# Patient Record
Sex: MALE | Race: WHITE | NOT HISPANIC OR LATINO | Employment: OTHER | URBAN - METROPOLITAN AREA
[De-identification: names, ages, dates, MRNs, and addresses within clinical notes are randomized per-mention and may not be internally consistent; named-entity substitution may affect disease eponyms.]

---

## 2017-04-21 ENCOUNTER — TELEPHONE (OUTPATIENT)
Dept: NEUROLOGY | Facility: CLINIC | Age: 82
End: 2017-04-21

## 2017-04-21 NOTE — TELEPHONE ENCOUNTER
Called Dr. Carrillo office and was notified that the provider was out for the day. Provider nurse was able to give me information on pt to call and go about scheduling pt a consult. I called pt son Milena and left a voicemail informing him to call back so I can schedule his father an appointment regarding parkinson's.       the number that was given was [phone] (milena pt son)

## 2017-04-25 ENCOUNTER — TELEPHONE (OUTPATIENT)
Dept: NEUROLOGY | Facility: CLINIC | Age: 82
End: 2017-04-25

## 2017-04-25 NOTE — TELEPHONE ENCOUNTER
----- Message from Bia Cisse sent at 4/25/2017  3:16 PM CDT -----  Contact: jenny (son) @ [phone]  Returning liane's call concerning pts appts.

## 2017-04-25 NOTE — TELEPHONE ENCOUNTER
Spoken to pt son and he stated that his father needs orders in regarding a feeding tube and hospital bed so that his father can come to here to [place] to live with him. He's been trying to contact Yoana the  or  to get everything transferred and faxed over so that his father can move here and continue his treatment regarding Parkinson's and vascular dementia.     Case manger or  name  Yoana Garvin  # [phone]  Fax# [phone]    E-mail: [email or web address]       [facility]

## 2017-04-25 NOTE — TELEPHONE ENCOUNTER
----- Message from Yvette Diaz sent at 4/24/2017 11:52 AM CDT -----  Contact: Kael treadwell [phone]  Kael is returning Lynette's call to schedule an new patient appt for his dad.

## 2017-04-28 ENCOUNTER — TELEPHONE (OUTPATIENT)
Dept: NEUROLOGY | Facility: CLINIC | Age: 82
End: 2017-04-28

## 2017-04-28 NOTE — TELEPHONE ENCOUNTER
Spoke with pt's son, Kael, in regards to working on scheduling appt to establish care for his PD here at [facility]. Pt is currently at [facility] in [place]. He currently has a PEG tube after recent aspiration due to disease progression(?). He is seeking assistance from case management here within [facility] to work on caregiver needs (hospital bed, feeding pump etc..) in order for the son to be able to take care of him at home. Patient has been scheduled or 5/18/17 with NP Elizabet to establish care (1.5 hrs dedicated for appt). Appt viewable in [email or web address] for Kael.

## 2017-04-28 NOTE — TELEPHONE ENCOUNTER
----- Message from Yvette Diaz sent at 4/27/2017  2:32 PM CDT -----  Contact: Kael treadwell [phone]  Kael is calling to speak with anyone that can help him get his dad an appt as soon as possible. Please call Kael back he was very annoyed (and rude) that he keeps missing Lynette's call's and that a time is not set aside for him to talk to a nurse about his dad's care. Please call

## 2017-05-18 ENCOUNTER — OFFICE VISIT (OUTPATIENT)
Dept: NEUROLOGY | Facility: CLINIC | Age: 82
End: 2017-05-18
Payer: MEDICARE

## 2017-05-18 VITALS
SYSTOLIC BLOOD PRESSURE: 100 MMHG | DIASTOLIC BLOOD PRESSURE: 59 MMHG | WEIGHT: 110.44 LBS | HEIGHT: 65 IN | BODY MASS INDEX: 18.4 KG/M2 | HEART RATE: 67 BPM

## 2017-05-18 DIAGNOSIS — G20.C PARKINSONISM, UNSPECIFIED PARKINSONISM TYPE: ICD-10-CM

## 2017-05-18 DIAGNOSIS — E46 MALNUTRITION: Primary | ICD-10-CM

## 2017-05-18 DIAGNOSIS — F02.80 DEMENTIA DUE TO PARKINSON'S DISEASE WITHOUT BEHAVIORAL DISTURBANCE: ICD-10-CM

## 2017-05-18 DIAGNOSIS — R13.10 DYSPHAGIA, UNSPECIFIED TYPE: ICD-10-CM

## 2017-05-18 DIAGNOSIS — G20.A1 DEMENTIA DUE TO PARKINSON'S DISEASE WITHOUT BEHAVIORAL DISTURBANCE: ICD-10-CM

## 2017-05-18 PROCEDURE — 99999 PR PBB SHADOW E&M-EST. PATIENT-LVL III: CPT | Mod: PBBFAC,,, | Performed by: NURSE PRACTITIONER

## 2017-05-18 PROCEDURE — 99205 OFFICE O/P NEW HI 60 MIN: CPT | Mod: S$PBB,,, | Performed by: NURSE PRACTITIONER

## 2017-05-18 PROCEDURE — 99213 OFFICE O/P EST LOW 20 MIN: CPT | Mod: PBBFAC | Performed by: NURSE PRACTITIONER

## 2017-05-18 RX ORDER — OXYBUTYNIN CHLORIDE 5 MG/1
5 TABLET ORAL DAILY
COMMUNITY
End: 2017-05-21

## 2017-05-18 RX ORDER — DONEPEZIL HYDROCHLORIDE 5 MG/1
5 TABLET, FILM COATED ORAL NIGHTLY
COMMUNITY
End: 2017-06-21 | Stop reason: SDUPTHER

## 2017-05-18 RX ORDER — CARBIDOPA AND LEVODOPA 25; 100 MG/1; MG/1
2 TABLET, EXTENDED RELEASE ORAL 2 TIMES DAILY
COMMUNITY

## 2017-05-18 NOTE — Clinical Note
Need to see him back at 0815 AM appt- please see me to arrange- Son will be out of country starting Yumiko 3.

## 2017-05-18 NOTE — LETTER
May 19, 2017      Marielena Rose MD  [address]  [facility] [address]           [facility]  [address]  [facility] [address]  Phone: [phone]  Fax: [phone]          Patient: Justin Sharif   MR Number: [id number]   YOB: 1931   Date of Visit: 5/18/2017       Dear Dr. Marielena Rose:    Thank you for referring Justin Sharif to me for evaluation. Attached you will find relevant portions of my assessment and plan of care.    If you have questions, please do not hesitate to call me. I look forward to following Justin Sharif along with you.    Sincerely,    Meghana Mcneal, CAPRICE    Enclosure  CC:  No Recipients    If you would like to receive this communication electronically, please contact [email or web address] or [phone] to request more information on [employer] access.    For providers and/or their staff who would like to refer a patient to [facility], please contact us through our one-stop-shop provider referral line, [facility], at [phone].    If you feel you have received this communication in error or would no longer like to receive these types of communications, please e-mail [email or web address]

## 2017-05-18 NOTE — PROGRESS NOTES
Name: Justin Sharif  MRN: [id number]   CSN: [id number]      Date: 05/18/2017    Referring physician:  Marielena Rose MD  [address] [place] [address]    Chief Complaint / Interval History: Consult      History of Present Illness (HPI):  86 yo RH male who present for evaluation for parkinsonism, suspected atypical, and possible vascular dementia per son, Kael, who accompanies him today. He was not aware of his first symptom,RH tremor, rather it was brought to his attention by his lady friend in summer of 2015. About the same time, he had a shuffled gait. Early into these symptoms, he was having problems swallowing but always attributed to drainage issues. He was given cd/ld, which never seemed to help and then Neupro Patch. The patch was very expensive and did not appear to help either. Neupro was was stopped when he was admitted (in [place]) recently for aspiration pneumonia and malnutrition. At the time Neupro was stopped, cd/ld was doubled (now taking CR 25/100- 2 tabs BID). Still no differences seen with this adjustment. In fact, they are not sure that he has ever had response to PD meds. Due to recent MRI brain, he was recently started on donepezil due to question of vascular dementia. He was also told that he has had several mini-strokes.     Prior to his hospital admission, he was living alone and independent with all ADLs. He was playing golf the Thursday before his recent hospital admission. Kael last saw him at [place] and was fine, normal weight, excellent appetite and normal cognitive state. To this point, they had not noted any degradation in cognition. From January to March, there was a definite change. Kael could hear changes in his voice when they would talk on the phone. By March, he was growing confused. His lady friend also noted changes. Due to sons concerns, neighbors went to check on him. At that point, he was admitted for aspiration pneumonia and was  "discovered to be severely malnourished. He says he was eating but not that much. He was not scared of choking and was not aware he had any issues with swallowing.      Mr. Sharif denies muscle stiffness. For the past couple of years, his speech has been declining. He did have ST.  Over the past few months, he has had had a rare feeling of lightheadedness, no syncope. Describes poor bladder control. If gets urge, he must go at once. He thinks this has been going on at least 9 months. Son thinks it has matthew at least the last 1.5 years because required Depends anytime he traveled. Mr. Sharif has noted shadows in his periphery only. Son has also noted he has observed unusual facial movements for the past year.     Son offers that when they traveled from [place] to [facility] yesterday, they stopped every 2 hours so he could use the restroom and then planned stops for bolus feeds and meds thru tube. Son states that this entire transfer was very coordinated so they could make today's appt so he can get his needed supplies and machine for tube feeds. This was not arranged when he left SNF in [place]. Son states he could not get anyone from here ([facility]) or facility in [place] to connect so they left with a case of feedings that might last him until Monday morning but not sure. They do not have a pump or tubing. He states no one here ( at [facility]) ever ordered the hospital bed or other equipment so he was left to buy a Tempurpedic mattress that would raise 40 degrees to accommodate safe feedings.        Nonmotor/Premotor ROS:  Dysphagia (HENT)?Yes  RBD/sleep issues (Constitutional)?No  Fatigue (Constitutional)?No  Constipation (GI)?No- but then says in past has had and some straining to start   Urinary issues ()?Yes  Orthostasis (Cardiovascular)?No  Falls (Musculoskeletal)?No  Cognitive impairment (Neurologic)?"think its pretty good, except for a few details"  Psychoses (Psychiatric)?No  Pain/Paresthesia (Neurologic)?No  Stridor / " "SOB (Pulm)?No    Past Medical History: The patient  has no past medical history on file.    Social History: The patient  reports that he has never smoked. He does not have any smokeless tobacco history on file.    Family History: Their family history is not on file.    Allergies: Alfuzosin and Codeine     Meds:   No current outpatient prescriptions on file prior to visit.     No current facility-administered medications on file prior to visit.        Exam: focused exam only today- given that the priority is now getting his home supplies and family education so he can get nourishment  BP (!) 100/59  Pulse 67  Ht 5' 5" (1.651 m)  Wt (!) 501 kg (1104 lb 8.1 oz)  .8 kg/m2    Constitutional  Thin, appears stated age, cooperative, pleasant   Neurological    * Mental status  MOCA = deferred     - Orientation  Oriented to person, place, and situation     - Memory   Not tested. Does participate in history.     - Attention/concentration  Attentive during exam     - Language  +2-step commands   * Cranial nerves       - CN II  PERRL, visual fields full to confrontation     - CN III, IV, VI  Extraocular movements full, normal pursuits and saccades     - CN V  Sensation V1 - V3 intact     - CN VII  Face strong and symmetric bilaterally     - CN VIII  Hearing intact bilaterally with hearing aids but is [place]     - CN IX, X  Palate raises midline and symmetric     - CN XI  SCM and trapezius 5/5 bilaterally     - CN XII  Tongue midline   * Gait  See below.   * Specialized movement exam  Last dose 0630- On CR- exam time 1200  Mild hypophonic speech, mild dysarthria.   Mild facial masking.   Cogwheel rigidity- BUE- mild, and increases on diversion (slt more noted LUE). Slt cogwheel LLE, and      Mild bradykinesia all extremities, noted more on left side.    No tremor noted today.   No evidence of dyskinesia noted today.     No other dystonia, chorea, athetosis, myoclonus, or tics.   Pushes self to stand.    No motor " impersistence.   Narrow-based gait.   Slt shortened stride length.   Slt stooped posture.   Reduced arm swing.       Laboratory/Radiological:  - Results:No results found for any previous visit.    Will review records son brought today.   - Independent review of images:    Diagnoses:          Parkinsonism- query atypical given timeline and progression  Questionable dementia, ? vascular  Dysphagia with recent aspiration pneumonia  Malnutrition- now with PEG tube          Medical Decision Makin yo gentleman presents for initial evaluation of parkinsonism and possible vascular dementia, accompanied by son, Kael. He was discharged from nursing rehab facility in [place] after being admitted for malnutrition and aspiration pneumonia. If he stayed in [place], he would have had to be admitted to a nursing home as could not manage tube feeds independently. Since Mr. Sharif was playing golf a few days before his hospital admission, Kael decided to bring him to [place] to live with him, rather than nursing home placement. aKel picked his father up from rehab facility yesterday at 0800 and arrived in [facility] at 2200 last night. One of the main goals for them today is to get needed equipment so he can be fed. Neurology is the only appointment scheduled. He does not have any appointments scheduled with any other providers. He does not have a PCP here at all.      According to Kael, Mr. Sharif was discharged from the rehab facility yesterday with one day supply of crushed meds and enough cans of feedings for about 3 days. There is no arrangement for home health. He does not have equipment to administer the continuous feedings. At this point, Kael and his wife are administering bolus feeds until they can get feeding pump. They are also administering meds per tube. He talks about how cumbersome this routine is for them when before he had staff to do this. As well orchestrated as some of this appears to be with records  and travel, there are significantly important pieces that did not happen. I have many concerns at this point, including that after one day of the tube feeding and med administering routine, it is already cumbersome.     Kael is dissatisfied that we ([facility]) did not have better coordination of services before they made this transfer, especially since he called the office. While I sympathize with their situation, this was not our charge. This should have been coordinated by the SNF in [place] with education to Kael what was needed. We cannot order DMEs and services for a patient that we have never met. Likewise, neurology does not manage tube feeds and I certainly do not feel comfortable doing so.       Clearly, the priority today is not trying to distinguish which type of parkinsonism and or dementia he may have.  The priority is nutrition and education for family.  If we cannot get this accomplished, he will need to be admitted. I am not sure how much education they were given before leaving [place].     I have ordered home health, including orders for tube feeding (Jevity 1.5 at 60cc/hr continuous - as per his discharge instructions from dated 5-16-17 from [place], [place]).   I have made it very clear that I do not manage tube feeds nor do I feel comfortable with this. I feel even more uncomfortable letting them try and navigate this on their own, especially going into the weekend.    I have informed Juan Hanson RN. He is working on getting him established with a geriatrician, internist, or gastroenterologist ASAP.    I have ordered stat referral for nutritionist. I am not certain that this service is available for outpatient.      From a parkinsonian standpoint, he will continue cd/ld for now. He will continue donepezil without change. He does have prescriptions from [place] for these to have filled. I will arrange another visit for this clinic. I would  like for him to come in without cd/ld at next visit.     I spent from [id number] face-to-face with the patient and son with >50% of the time spent with counseling and education regarding above plan.    ..      Meghana Mcneal, SETH, NP-C  Division of Movement and Memory Disorders  [facility]  [phone]

## 2017-05-19 ENCOUNTER — TELEPHONE (OUTPATIENT)
Dept: NEUROLOGY | Facility: CLINIC | Age: 82
End: 2017-05-19

## 2017-05-19 ENCOUNTER — TELEPHONE (OUTPATIENT)
Dept: INTERNAL MEDICINE | Facility: CLINIC | Age: 82
End: 2017-05-19

## 2017-05-19 DIAGNOSIS — E46 MALNUTRITION: Primary | ICD-10-CM

## 2017-05-19 NOTE — TELEPHONE ENCOUNTER
I spoke with patient's son about an appointment to be seen in clinic today.  Patient's son stated that home health would be coming out today and would prefer to be seen on tomorrow or Sunday.  I offered an urgent appointment for Saturday and/or an appointment on Sunday to establish care with Dr. Rae.  Patient's son accepted an appointment for Sunday at 10 am to establish care with Dr. Rae

## 2017-05-19 NOTE — TELEPHONE ENCOUNTER
Called Mayelin back at [facility] about message.  Mayelin states that Physician needs to separately order feeding pump because they are not carried, and were not included in the order for Nutrition or Home Health.  I stated that Meghana ordered the tube feeding and did not know it would be a separate order being that you cannot tube feed without tube.  Mayelin also stated that We, as in our staff needs to send faxed order to [employer] or a company of choice that accepts patient's insurance to facilitate.  I stated that Meghana is out of the office and i will try to have order put in and signed by physician Meghana works with, and send accordingly. Mayelin verbally expressed understanding.

## 2017-05-19 NOTE — TELEPHONE ENCOUNTER
----- Message from Ev Hardy MA sent at 5/19/2017 10:07 AM CDT -----  Good Morning,    I work with Meghana Mcneal NP in Neuro Clinic, who has put orders for AMB referral for Nutrition services  for Mr. Sharif as STAT.  This isn't something we typically order.  It is an internal order, but for outpatient. Also, we just received word that patient is being seen by Home Health today, but i'm not sure if this order needs to be facilitated separately.  Any help with this patient will be greatly appreciated!   Happy Friday!    Ev Hardy MA

## 2017-05-21 ENCOUNTER — OFFICE VISIT (OUTPATIENT)
Dept: INTERNAL MEDICINE | Facility: CLINIC | Age: 82
End: 2017-05-21
Payer: MEDICARE

## 2017-05-21 ENCOUNTER — TELEPHONE (OUTPATIENT)
Dept: INTERNAL MEDICINE | Facility: CLINIC | Age: 82
End: 2017-05-21

## 2017-05-21 VITALS
HEART RATE: 67 BPM | OXYGEN SATURATION: 99 % | SYSTOLIC BLOOD PRESSURE: 100 MMHG | BODY MASS INDEX: 18.33 KG/M2 | HEIGHT: 65 IN | DIASTOLIC BLOOD PRESSURE: 60 MMHG | WEIGHT: 110 LBS

## 2017-05-21 DIAGNOSIS — L85.3 DRY SKIN: ICD-10-CM

## 2017-05-21 DIAGNOSIS — E53.8 VITAMIN B12 DEFICIENCY: ICD-10-CM

## 2017-05-21 DIAGNOSIS — E55.9 VITAMIN D INSUFFICIENCY: ICD-10-CM

## 2017-05-21 DIAGNOSIS — E87.6 HYPOKALEMIA: ICD-10-CM

## 2017-05-21 DIAGNOSIS — E46 MALNUTRITION: Primary | ICD-10-CM

## 2017-05-21 DIAGNOSIS — R35.0 URINARY FREQUENCY: ICD-10-CM

## 2017-05-21 DIAGNOSIS — R13.10 DYSPHAGIA, UNSPECIFIED TYPE: ICD-10-CM

## 2017-05-21 DIAGNOSIS — H35.30 MACULAR DEGENERATION, BILATERAL: ICD-10-CM

## 2017-05-21 PROCEDURE — 99205 OFFICE O/P NEW HI 60 MIN: CPT | Mod: S$PBB,,, | Performed by: INTERNAL MEDICINE

## 2017-05-21 PROCEDURE — 99214 OFFICE O/P EST MOD 30 MIN: CPT | Mod: PBBFAC | Performed by: INTERNAL MEDICINE

## 2017-05-21 PROCEDURE — 99999 PR PBB SHADOW E&M-EST. PATIENT-LVL IV: CPT | Mod: PBBFAC,,, | Performed by: INTERNAL MEDICINE

## 2017-05-21 RX ORDER — OXYBUTYNIN CHLORIDE 5 MG/1
5 TABLET, EXTENDED RELEASE ORAL DAILY
Qty: 30 TABLET | Refills: 11 | Status: SHIPPED | OUTPATIENT
Start: 2017-05-21 | End: 2017-06-01

## 2017-05-21 NOTE — PATIENT INSTRUCTIONS
Please use over-the-counter Eucerin, Cetaphil, or Aveeno once daily after taking a shower to help keep skin healthy. Please also use sunscreen and skin covering when outdoors in the sun.

## 2017-05-21 NOTE — PROGRESS NOTES
"Subjective:       Patient ID: Justin Sharif is a 85 y.o. male.    Chief Complaint: Establish Care    HPI    Patient is accompanied by his son, Kael.    New pt, first visit with me, seen in past by Neurology. Pt reports that was living in [place], had aspiration pneumonia recently. Was on 24 hr continuous feed, went on hospital at 110#, had low prealbumin, likely due to aspiration. Has been getting bolus feeds, only 8 cans of food left. Uses Jevity 1.5 60 ml/hr 1440mL.    HH came out on Fri. Discussed nutrition. Going to have Physical Therapy evaluation tomorrow. Started with Neurology in 2015. Pt is hoping for ST/PT/OT to help with swallowing. Has upcoming appointment for PT with Home Health. Lungs now clear; RN noted decreased sounds in R side. "Biggest problem is thick saliva". Using Biotene. Also going to bathroom more frequently; has been getting up at least once for long time, but now going more frequently. Now 4x/night.    Review outside records indicates hospitalization with admission April 2017 for Parkinson's disease with aspiration pneumonia.  During that admission he was noted to have failed modified barium swallow, decision was made to place PEG tube.  An MRI of the brain was performed during this hospital stay, noted no acute stroke or significant intracranial process.  There is mild to moderate cerebral volume loss with evidence of possible chronic microvascular ischemia.    Review of Systems   All other systems reviewed and are negative.      Objective:      Physical Exam   Constitutional: No distress.   Thin  man   HENT:   Head: Atraumatic.   Right Ear: Tympanic membrane normal.   Left Ear: Tympanic membrane normal.   Mouth/Throat: Oropharynx is clear and moist. No oropharyngeal exudate.   Wears bilateral hearing aides   Eyes: Conjunctivae are normal. Pupils are equal, round, and reactive to light. Right eye exhibits no discharge. Left eye exhibits no discharge.   Neck: Normal range of motion. No " "thyromegaly present.   Cardiovascular: Normal rate, regular rhythm and normal heart sounds.    Pulses:       Dorsalis pedis pulses are 1+ on the right side, and 1+ on the left side.        Posterior tibial pulses are 1+ on the right side, and 1+ on the left side.   Pulmonary/Chest: Effort normal and breath sounds normal. He has no wheezes. He has no rales.   Abdominal: Soft. He exhibits no distension and no mass. There is no hepatosplenomegaly. There is no tenderness. There is no rigidity, no guarding and negative Mahmood's sign.   PEG tube in RUQ, site has bandage overlying that is clean, dry, and intact.   Musculoskeletal: He exhibits no edema or tenderness.   Bunion noted in lateral aspect of left foot   Feet:   Right Foot:   Skin Integrity: Negative for ulcer, blister, skin breakdown or erythema.   Left Foot:   Skin Integrity: Negative for ulcer, blister, skin breakdown or erythema.   Lymphadenopathy:     He has no cervical adenopathy.   Neurological: He is alert. Gait normal.   Able to get up to and down from exam table to chair without limitation or assistance.   Skin: Skin is warm and dry. Rash (mild erythema in patches along chin and neck) noted.   Small scratch hong along R arm distal to elbow, healing well without exudate.   Psychiatric: He has a normal mood and affect. His behavior is normal.   Nursing note and vitals reviewed.      Vitals:    05/21/17 1015   BP: 100/60   Pulse: 67   SpO2: 99%   Weight: 49.9 kg (110 lb)   Height: 5' 5" (1.651 m)     Body mass index is 18.3 kg/m².    RESULTS: Reviewed outside stress test 2008, also ultrasound aorta 2003. Reviewed outside labs from last 2 months.    Assessment:       1. Malnutrition    2. Urinary frequency    3. Dysphagia, unspecified type    4. Macular degeneration, bilateral    5. Hypokalemia    6. Vitamin B12 deficiency    7. Dry skin    8. Vitamin D insufficiency        Plan:   Justin was seen today for establish care.    Diagnoses and all orders for this " "visit:    Malnutrition:  This is likely due to dysphagia from his neurologic dysfunction.  Weight has steadily been increasing with continuous tube feeds from skilled nursing.  Patient is now on bolus feeds as he is no longer in a nursing facility.  We will continue to work with home health and nutrition to determine optimal nutrition status.  -     Vitamin D; Future  -     CBC Without Differential; Future  -     TSH; Future  -     Magnesium; Future  -     PREALBUMIN; Future  -     Ambulatory referral to Outpatient Case Management    Urinary frequency:  Outside urine tests last mo were normal. Relieved during day with oxybutynin IR, change to long-acting, if side effects develop notify the office.  -     oxybutynin (DITROPAN-XL) 5 MG TR24; Take 1 tablet (5 mg total) by mouth once daily.    Dysphagia, unspecified type:  Continue bolus feeds of Jevity for now, continue work with Home Health and nutrition to determine if continuous feeds still necessary. Refer to Case Mgmt for assistance. I will message GI to see if there are any limitations to golfing when a pt has a PEG tube.  -     Ambulatory referral to Outpatient Case Management    Macular degeneration, bilateral:  Receiving injections, refer to Ophthalmology for evaluation, request prior records.  -     Ambulatory referral to Ophthalmology    Dry skin:  "Please use over-the-counter Eucerin, Cetaphil, or Aveeno once daily after taking a shower to help keep skin healthy. Please also use sunscreen and skin covering when outdoors in the sun."    Hypokalemia:  Seen on recent labs, recheck.  -     Comprehensive metabolic panel; Future    Vitamin B12 deficiency:  Continue biweekly B12 for now, once complete start over-the-counter B12 daily via PEG.  -     Vitamin B12; Future    Vitamin D insufficiency  -     Vitamin D; Future    Other orders  -     lactose-reduced food with fibr (JEVITY 1.5 RAEANN) 0.06 gram-1.5 kcal/mL Liqd; Give 1 can (237 cc) every 3-4 hours (total 6 " cans per day).    Return in about 8 weeks (around 7/16/2017).  Virgil Rae MD  Internal Medicine    Portions of this note were completed using Dragon medical dictation software. Please excuse typographical or syntax errors.

## 2017-05-22 ENCOUNTER — PATIENT MESSAGE (OUTPATIENT)
Dept: INTERNAL MEDICINE | Facility: CLINIC | Age: 82
End: 2017-05-22

## 2017-05-22 ENCOUNTER — TELEPHONE (OUTPATIENT)
Dept: NEUROLOGY | Facility: CLINIC | Age: 82
End: 2017-05-22

## 2017-05-22 ENCOUNTER — OUTPATIENT CASE MANAGEMENT (OUTPATIENT)
Dept: ADMINISTRATIVE | Facility: OTHER | Age: 82
End: 2017-05-22

## 2017-05-22 NOTE — TELEPHONE ENCOUNTER
Patient recently transferred care from [place].  [facility] home [facility] evaluated on Friday, sent me orders to sign today for dietician. Please call to confirm everything is in place and no additional orders required.  Also, do they need a specific order for his Jevity tube feeding?    Patient was also receiving vitamin B12 injections every 2 weeks.  Please ask nursing to begin administering intramuscular vitamin B12 1000 µg every 2 weeks while they are working with him.    Lastly, I would like to follow up the patient in 6-8 weeks to ensure that things are stable.  I would like him scheduled for a 40 minute follow-up [facility] visit. Please schedule with the patient.

## 2017-05-22 NOTE — TELEPHONE ENCOUNTER
Called and left voice message for sonKael to return call.  Meghana would like to see patient back on 5/30 8:15 for two hour visit.  Also to advise patient not to take any medication before visit.

## 2017-05-22 NOTE — PROGRESS NOTES
For your Information:    Please note the following patient has been assigned to SUSI Lawler with [facility] for screening.    Reason: Low/Mod Risk    Referral Diagnosis:   Malnutrition  Dysphagia, unspecified type    Referral Comment:   Patiently recently moved here from [place] to live with his son, who is now serving as the primary caregiver. The patient has had progressive neurologic symptoms requiring PEG tube and tube feeds. He was receiving this via skilled nursing facility back in [place] before coming down on Thursday, May 18. [facility] has started to work with the patient. These assist with coordination of care at [facility] along with coordination of services through home health. If there are other orders needed for continued tube feeds, please let me know.    Please contact [facility] at [phone] with questions.    Thank you,  Sarina Quiros, SSC

## 2017-05-22 NOTE — TELEPHONE ENCOUNTER
----- Message from Meghana Mcneal NP sent at 5/19/2017 10:14 AM CDT -----  Need to see him back at 0815 AM appt- please see me to arrange- Son will be out of country starting Yumiko 3.

## 2017-05-23 ENCOUNTER — OUTPATIENT CASE MANAGEMENT (OUTPATIENT)
Dept: ADMINISTRATIVE | Facility: OTHER | Age: 82
End: 2017-05-23

## 2017-05-23 ENCOUNTER — TELEPHONE (OUTPATIENT)
Dept: INTERNAL MEDICINE | Facility: CLINIC | Age: 82
End: 2017-05-23

## 2017-05-23 ENCOUNTER — TELEPHONE (OUTPATIENT)
Dept: NEUROLOGY | Facility: CLINIC | Age: 82
End: 2017-05-23

## 2017-05-23 DIAGNOSIS — R63.4 WEIGHT LOSS: ICD-10-CM

## 2017-05-23 DIAGNOSIS — R13.10 DYSPHAGIA, UNSPECIFIED TYPE: ICD-10-CM

## 2017-05-23 DIAGNOSIS — E46 MALNUTRITION: Primary | ICD-10-CM

## 2017-05-23 DIAGNOSIS — E53.8 VITAMIN B12 DEFICIENCY: ICD-10-CM

## 2017-05-23 NOTE — TELEPHONE ENCOUNTER
Called patient's son, Kael, twice to schedule patient's follow up with Meghana; have yet to hear back from him.  Scheduled patient for 5/30 for 2 hour OFF/ON evaluation at 8:15am. Left voicemail for patient's son.

## 2017-05-23 NOTE — TELEPHONE ENCOUNTER
----- Message from SUSI Arellano sent at 5/23/2017 12:15 PM CDT -----  This CSW received a referral on the above patient.  This CSW contacted patient regarding referral.  CSW completed assessment with patient . No needs were identified.     Thank you for the referral,  SUSI Lawler

## 2017-05-23 NOTE — PROGRESS NOTES
This CSW contacted patient  regarding referral.  . CSW completed assessment with patient .  Patient reports that HH agency has been out to the home to provide care.  Patient stated there were no needs at this time.  Patient reports that he would like his son to speak with this CSW..  CSW provided patient with her contact information.

## 2017-05-24 ENCOUNTER — OUTPATIENT CASE MANAGEMENT (OUTPATIENT)
Dept: ADMINISTRATIVE | Facility: OTHER | Age: 82
End: 2017-05-24

## 2017-05-24 NOTE — TELEPHONE ENCOUNTER
Dr Rae I had sent a mssg to [facility]   In regards to the dietcian and the B12 injections   See response below

## 2017-05-24 NOTE — TELEPHONE ENCOUNTER
Good Morning Darlene  I was wondering if you could hepl me out   Dr Rae wanted to make sure that this pt    recently transferred care from [place].  [facility] evaluated on Friday, sent me orders to sign  for dietician which has been done.  He wants me to confirm everything is in place and no additional orders required.  Also, do they need a specific order for his Jevity tube feeding? But I do not know who to contact or send this to?     Patient was also receiving vitamin B12 injections every 2 weeks.  Please ask nursing to begin administering intramuscular vitamin B12 1000 µg every 2 weeks while they are working with him.     Can you help me if at all possible?  Yee

## 2017-05-24 NOTE — PROGRESS NOTES
"CSW received a follow up e-mail from Dr. Butch HAUSER regarding if patient needed a specific order for  Jevity tube feeding.  Dr. Rae also wanted HH to start administering intramuscular vitamin B12 1000 µg every 2 weeks while they are working with him."   CSW contacted Lashell Bonilla for [facility] . [phone]. Lashell advised this CSW that an order has not been placed for dietician services. Lashell advised this CSW that their nurses provide nutritional education , however if doctor is requesting a dietician an order has to be placed. Dietician services are handle through another company call [facility]. Lashell advised this CSW once order has been placed they will work with [facility] for dietician consult. Lashell advised this CSW that an order would need to be placed for B12 injections. Lashell advised this CSW that clarification for tube feeding was sent to Elizabet Kowalski NP, however they have not received a response. [facility] is handling patient equipment and supplies, however still waiting on response from Elizabet Reed NP. CSW advised Lashell that patient is being followed by PCP Dr. Rae. CSW also contacted patient son to verify if any additionl resources are needed. CSW left a message for a return call on recorder.         "

## 2017-05-24 NOTE — TELEPHONE ENCOUNTER
----- Message from SUSI Arellano sent at 5/24/2017  4:18 PM CDT -----  CSW contacted Lashell Bonilla for [facility]. [phone]. Lashell advised this CSW that an order has not been placed for dietician services. Lashell advised this CSW that their nurses provide nutritional education , however if doctor is requesting a dietician an order has to be placed. Dietician services are handle through another company call [facility]. Lashell advised this CSW once order has been placed they will work with [facility] for dietician consult. Lashell advised this CSW that an order would need to be placed for B12 injections. Lashell advised this CSW that clarification for tube feeding was sent to Elizabet Kowalski NP, however they have not received a response. [facility] is handling patient equipment and supplies, however still waiting on response from Elizabet Reed NP. CSW advised Lashell that patient is being followed by PCP Dr. Rae. Please fax updated orders to [phone]      SUSI Lawler

## 2017-05-25 RX ORDER — CYANOCOBALAMIN 1000 UG/ML
1000 INJECTION, SOLUTION INTRAMUSCULAR; SUBCUTANEOUS
Qty: 10 ML | Refills: 3 | Status: SHIPPED | OUTPATIENT
Start: 2017-05-25 | End: 2017-06-01 | Stop reason: SDUPTHER

## 2017-05-25 NOTE — TELEPHONE ENCOUNTER
Spoke with Lashell. Will fax orders for B12 injection @ [phone]. Advised her on instructions per MD regarding GI referral, tube feeding, and nutrition services. Waiting for call back from pt in regards to what pharmacy he uses so we can send the order for B12 injections. Lashell verbalized understanding.

## 2017-05-25 NOTE — TELEPHONE ENCOUNTER
I have placed an order for B12 injections once every 2 weeks.  Please fax prescription to the number listed in the message below.  Please ask RN to administer q2 weeks.    I am unable to sign the order for nutrition services, as Medicare will not cover this for evaluation of tube feedings.  As a result I am referring to gastroenterology instead for further evaluation.  Please notify home health that I would like for nursing or case management to provide recommendations for tube feedings at this time until patient can be seen by gastroenterology.

## 2017-05-26 ENCOUNTER — OUTPATIENT CASE MANAGEMENT (OUTPATIENT)
Dept: ADMINISTRATIVE | Facility: OTHER | Age: 82
End: 2017-05-26

## 2017-05-26 NOTE — PROGRESS NOTES
This CSW received a message on recorder from patient son. CSW contacted patient son no answer CSW left a message

## 2017-05-30 ENCOUNTER — OFFICE VISIT (OUTPATIENT)
Dept: NEUROLOGY | Facility: CLINIC | Age: 82
End: 2017-05-30
Payer: MEDICARE

## 2017-05-30 VITALS
BODY MASS INDEX: 18.55 KG/M2 | DIASTOLIC BLOOD PRESSURE: 68 MMHG | HEART RATE: 64 BPM | HEIGHT: 65 IN | WEIGHT: 111.31 LBS | SYSTOLIC BLOOD PRESSURE: 143 MMHG

## 2017-05-30 DIAGNOSIS — G20.C PARKINSONISM, UNSPECIFIED PARKINSONISM TYPE: Primary | ICD-10-CM

## 2017-05-30 PROCEDURE — 99999 PR PBB SHADOW E&M-EST. PATIENT-LVL III: CPT | Mod: PBBFAC,,, | Performed by: NURSE PRACTITIONER

## 2017-05-30 PROCEDURE — 99215 OFFICE O/P EST HI 40 MIN: CPT | Mod: S$PBB,,, | Performed by: NURSE PRACTITIONER

## 2017-05-30 PROCEDURE — 99213 OFFICE O/P EST LOW 20 MIN: CPT | Mod: PBBFAC | Performed by: NURSE PRACTITIONER

## 2017-05-30 NOTE — PROGRESS NOTES
Name: Justin Sharif  MRN: [id number]   CSN: [id number]      Date: 05/30/2017    Referring physician:  Virgil Rae MD  [address] [place] [address]    Chief Complaint / Interval History: Follow-up      History of Present Illness (HPI):    Last dose cd/ld was last given at 1500 yesterday. He is not aware of any changes without cd/ld. His son disagrees stating that he is slower with thoughts, movements and actions. Mr. Sharif takes offense to these observations.     Mr. Sharif states he could not get comfortable last night to sleep. He states that he has not had any issues with this before last night.     Kael states that in Dec 2016, he was sharp cognitively. Beginning in March, noted changes whey they spoke on the phone. He was admitted to hospital late March/early April for malnutrition and aspiration pneumonia. Despite tube feeds, Kael has not noted cognitive change back to baseline.     Denies tremor.   Denies stiffness in muscles.   Denies slowness.  Balance -don't notice any difference without meds. States once in awhile he will notice a stiffness or shakiness when walks.   He is not aware of shuffling.   Son states he missed a step yesterday on the golf course when they were hitting balls. Mr. Sharif says he did not see the step. Did not fall, son caught him.     He does not take anything by mouth.  Home health has had trouble getting pump approved.     Does exercises daily and walks around the block 15 minutes daily.     Initial Consult: 05/18/2017  Chief Complaint / Interval History: Consult  History of Present Illness (HPI):  84 yo RH male who present for evaluation for parkinsonism, suspected atypical, and possible vascular dementia per son, Kael, who accompanies him today. He was not aware of his first symptom,RH tremor, rather it was brought to his attention by his lady friend in summer of 2015. About the same time, he had a shuffled gait. Early into these symptoms, he was having problems  swallowing but always attributed to drainage issues. He was given cd/ld, which never seemed to help and then Neupro Patch. The patch was very expensive and did not appear to help either. Neupro was was stopped when he was admitted (in [place]) recently for aspiration pneumonia and malnutrition. At the time Neupro was stopped, cd/ld was doubled (now taking CR 25/100- 2 tabs BID). Still no differences seen with this adjustment. In fact, they are not sure that he has ever had response to PD meds. Due to recent MRI brain, he was recently started on donepezil due to question of vascular dementia. He was also told that he has had several mini-strokes.   Prior to his hospital admission, he was living alone and independent with all ADLs. He was playing golf the Thursday before his recent hospital admission. Kael last saw him at [place] and was fine, normal weight, excellent appetite and normal cognitive state. To this point, they had not noted any degradation in cognition. From January to March, there was a definite change. Kael could hear changes in his voice when they would talk on the phone. By March, he was growing confused. His lady friend also noted changes. Due to sons concerns, neighbors went to check on him. At that point, he was admitted for aspiration pneumonia and was discovered to be severely malnourished. He says he was eating but not that much. He was not scared of choking and was not aware he had any issues with swallowing.    Mr. Sharif denies muscle stiffness. For the past couple of years, his speech has been declining. He did have ST.  Over the past few months, he has had had a rare feeling of lightheadedness, no syncope. Describes poor bladder control. If gets urge, he must go at once. He thinks this has been going on at least 9 months. Son thinks it has matthwe at least the last 1.5 years because required Depends anytime he traveled. Mr. Sharif has noted shadows in his periphery only. Son has also noted  "he has observed unusual facial movements for the past year.   Son offers that when they traveled from [place] to [facility] yesterday, they stopped every 2 hours so he could use the restroom and then planned stops for bolus feeds and meds thru tube. Son states that this entire transfer was very coordinated so they could make today's appt so he can get his needed supplies and machine for tube feeds. This was not arranged when he left [facility] in [place]. Son states he could not get anyone from here ([facility]) or facility in [place] to connect so they left with a case of feedings that might last him until Monday morning but not sure. They do not have a pump or tubing. He states no one here ( at [facility]) ever ordered the hospital bed or other equipment so he was left to buy a Tempurpedic mattress that would raise 40 degrees to accommodate safe feedings.       Nonmotor/Premotor ROS:  RBD/sleep issues (Constitutional)?No  Depression/anxiety (Psychiatric)?No  Fatigue (Constitutional)?No  Constipation (GI)?No  Urinary issues ()?Yes- frequency  Orthostasis (Cardiovascular)?No  Falls (Musculoskeletal)?No  Cognitive impairment (Neurologic)?'Some short term is tough"   Psychoses (Psychiatric)?No  Pain/Paresthesia (Neurologic)?No  Visual changes (Eyes)?Yes "age related macular degeneration" denies blurred vision or diplopia  Moles / skin changes (Skin)?Yes- "thin"  Stridor / SOB (Pulm)?No  Bruising (Heme)?Yes    Past Medical History: The patient  has a past medical history of Aspiration pneumonia (2017); Hiatal hernia (11/2013); History of colonic polyps (9/19/2013); Internal hemorrhoids (11/2013); and Schatzki's ring (11/2013).    Social History: The patient  reports that he has quit smoking. He quit after 14.00 years of use. He quit smokeless tobacco use about 52 years ago.    Family History: Their family history is not on file.    Allergies: Alfuzosin and Codeine     Meds:   Current Outpatient Prescriptions on File Prior to Visit " "  Medication Sig Dispense Refill    carbidopa-levodopa  mg (SINEMET CR)  mg TbSR Take 2 tablets by mouth 2 (two) times daily.      cyanocobalamin 1,000 mcg/mL injection Inject 1 mL (1,000 mcg total) into the muscle every 14 (fourteen) days. 10 mL 3    donepezil (ARICEPT) 5 MG tablet Take 5 mg by mouth every evening.      lactose-reduced food with fibr (JEVITY 1.5 RAEANN) 0.06 gram-1.5 kcal/mL Liqd Give 1 can (237 cc) every 3-4 hours (total 6 cans per day). 30 Can 11    oxybutynin (DITROPAN-XL) 5 MG TR24 Take 1 tablet (5 mg total) by mouth once daily. 30 tablet 11     No current facility-administered medications on file prior to visit.        Exam:  BP (!) 143/68   Pulse 64   Ht 5' 5" (1.651 m)   Wt 50.5 kg (111 lb 5.3 oz)   BMI 18.53 kg/m²     Constitutional  Well-developed, well-nourished, appears stated age   Cardiovascular  Radial pulses 2+ and symmetric, no LE edema bilaterally   Neurological    * Mental status  MOCA =      - Orientation  Oriented to person, place, time, and situation     - Memory   Intact recent and remote     - Attention/concentration  Attentive, vigilant during exam     - Language  Naming & repetition intact, +2-step commands     - Fund of knowledge  Aware of current events     - Executive  Well-organized thoughts   * Cranial nerves       - CN II  PERRL, visual fields full to confrontation     - CN III, IV, VI  Extraocular movements full, normal pursuits and saccades, no gaze palsy     - CN V  Sensation V1 - V3 intact     - CN VII  Face strong and symmetric bilaterally     - CN VIII  Hearing intact to finger rub R ear, diminished L ear.      - CN IX, X  Palate raises midline and symmetric     - CN XI  SCM and trapezius 5/5 bilaterally     - CN XII  Tongue midline   * Motor  Muscle bulk normal, strength 5/5 throughout   * Sensory   Intact to temperature and vibration throughout   * Coordination  No dysmetria with finger-to-nose or heel-to-shin   * Gait  See below.       Last " dose cd/ld CR was 1500 yesterday  He is not as talkative or animated this morning when compared to my initial visit with him earlier this month.    Recites months of year backwards without diff or hesitation, does invoke subtle very low amplitude rest tremor in LH.   Records previous describe palatal tremor- do not see today.     No construction apraxias.     ..III.  MOTOR EXAMINATION        Speech  2 - Monotone, slurred but understandable; moderately impaired.   Facial Expression  2 - Slight but definitely abnormal diminution of facial expression.    Tremor at Rest:      Face, lips, chin 0 - Absent.    Hands:      right 1- intermittent and provoked on diversion only   left 1 - Slight and infrequently present.    Feet:      right 0 - Absent.    left 0 - Absent.    Action or Postural Tremor of Hands      right 1 - Slight; present with action.   left 1 - Slight; present with action.   Rigidity      Neck 0 - Absent.   Upper Extremity: Right 2 - Mild.   Upper Extremity: Left 2 - Mild.   Lower Extremity: Right 1 - Slight or detectable only when activated by mirror or other movements.   Lower Extremity: Left 0 - Absent.   Finger Taps      right 2 - Moderately impaired. Definite and early fatiguing. May have occasional arrests in movement.   left 4 - Can barely perform the task.    Hand Movements      right 2 - Moderately impaired. Definite and early fatiguing. May have occasional arrests in movement.   left 2 - Moderately impaired. Definite and early fatiguing. May have occasional arrests in movement.  L>R   Rapid Alternating Movements of Hands      right 1 - Mild slowing and/or reduction in amplitude.   left 2 - Moderately impaired. Definite and early fatiguing. May have occasional arrests in movement.   Leg Agility      right 1 - Mild slowing and/or reduction in amplitude.   left 2 - Moderately impaired. Definite and early fatiguing. May have occasional arrests in movement.   Arising from Chair  0 - Normal.   Posture  2  - Moderately stooped posture, definitely abnormal; can be slightly leaning ot one side.    Gait  2 - Walks with difficulty, but requires little or no assistance; may have some festination, short steps, or propulsion.   Postural Stability (Response to sudden, strong posterior displacement produced by pull on shoulders while patient erect with eyes open and feet slightly apart. Patient is prepared, and can have had some practice runs.)  deferred   Body Bradykinesia and Hypokinesia (Combining slowness, hesitancy, decreased armswing, small amplitude, and poverty of movement in general)  2 - Mild degree of slowness and poverty of movement which is definitely abnormal.     Was going to do OFF/ON eval. Did not bring meds or syringe to administer.       Laboratory/Radiological:  - Results:  Lab Visit on 05/21/2017   Component Date Value Ref Range Status    Vit D, 25-Hydroxy 05/21/2017 43  30 - 96 ng/mL Final    Vitamin B-12 05/21/2017 780  210 - 950 pg/mL Final    WBC 05/21/2017 6.94  3.90 - 12.70 K/uL Final    RBC 05/21/2017 4.05* 4.60 - 6.20 M/uL Final    Hemoglobin 05/21/2017 12.6* 14.0 - 18.0 g/dL Final    Hematocrit 05/21/2017 38.9* 40.0 - 54.0 % Final    MCV 05/21/2017 96  82 - 98 fL Final    MCH 05/21/2017 31.1* 27.0 - 31.0 pg Final    MCHC 05/21/2017 32.4  32.0 - 36.0 % Final    RDW 05/21/2017 15.6* 11.5 - 14.5 % Final    Platelets 05/21/2017 223  150 - 350 K/uL Final    MPV 05/21/2017 11.3  9.2 - 12.9 fL Final    Sodium 05/21/2017 135* 136 - 145 mmol/L Final    Potassium 05/21/2017 4.5  3.5 - 5.1 mmol/L Final    Chloride 05/21/2017 97  95 - 110 mmol/L Final    CO2 05/21/2017 29  23 - 29 mmol/L Final    Glucose 05/21/2017 76  70 - 110 mg/dL Final    BUN, Bld 05/21/2017 21  8 - 23 mg/dL Final    Creatinine 05/21/2017 0.8  0.5 - 1.4 mg/dL Final    Calcium 05/21/2017 9.1  8.7 - 10.5 mg/dL Final    Total Protein 05/21/2017 7.0  6.0 - 8.4 g/dL Final    Albumin 05/21/2017 3.5  3.5 - 5.2 g/dL Final     Total Bilirubin 05/21/2017 0.6  0.1 - 1.0 mg/dL Final    Alkaline Phosphatase 05/21/2017 85  55 - 135 U/L Final    AST 05/21/2017 22  10 - 40 U/L Final    ALT 05/21/2017 7* 10 - 44 U/L Final    Anion Gap 05/21/2017 9  8 - 16 mmol/L Final    eGFR if African American 05/21/2017 >60.0  >60 mL/min/1.73 m^2 Final    eGFR if non African American 05/21/2017 >60.0  >60 mL/min/1.73 m^2 Final    TSH 05/21/2017 1.633  0.400 - 4.000 uIU/mL Final    Magnesium 05/21/2017 2.4  1.6 - 2.6 mg/dL Final    Prealbumin 05/22/2017 27  20 - 43 mg/dL Final       - Independent review of images:              Diagnoses:          Parkinsonism   -timeline of 2015 + aspiration pneumonia 3-2017, now requiring PEG is suspicious for atypical but does appear of have l-dopa response   Question of vascular dementia per son by neuropsychologist who reviewed [place]   -cognitive changes were not present until had malnourishment in March         Medical Decision Making:    Continue carbidopa/levodopa CR 2 tabs 6-12-6-12 without change.   Discussed oxybutynin - was started while in hospital in April. May be impairing memory. Mr. Sharif does not think it helps. They may consider stopping this when Kael returns for overseas mid-June.   Discussed various parkinsonisms and vascular dementia - including diagnosis, treatment, prognosis.   He is off today- MOCA deferred.   Will have him follow up 3 months with Dr. Bolanos - new to him.   Call for problems.   Continue exercise.     I spent 80 minutes face-to-face with the patient and son with >50% of the time spent with counseling and education regarding:  - results of data, diagnosis, and recommendations stated above  - the prognosis of parkinsonisms  - risks and benefits of cd/ld   - importance of diet and exercise    Meghana Mcneal, DNP, NP-C  Division of Movement and Memory Disorders  [facility]  [phone]

## 2017-05-30 NOTE — LETTER
May 30, 2017      Virgil Rae MD  [address]  [facility] [address]           [facility]  [address]  [facility] [address]  Phone: [phone]  Fax: [phone]          Patient: Justin Sharif   MR Number: [id number]   YOB: 1931   Date of Visit: 5/30/2017       Dear Dr. Virgil Rae:    Thank you for referring Justin Sharif to me for evaluation. Attached you will find relevant portions of my assessment and plan of care.    If you have questions, please do not hesitate to call me. I look forward to following Justin Sharif along with you.    Sincerely,    Meghana Mcneal, CAPRICE    Enclosure  CC:  No Recipients    If you would like to receive this communication electronically, please contact [email or web address] or [phone] to request more information on [employer] access.    For providers and/or their staff who would like to refer a patient to [facility], please contact us through our one-stop-shop provider referral line, [facility], at [phone].    If you feel you have received this communication in error or would no longer like to receive these types of communications, please e-mail [email or web address]

## 2017-06-01 ENCOUNTER — TELEPHONE (OUTPATIENT)
Dept: INTERNAL MEDICINE | Facility: CLINIC | Age: 82
End: 2017-06-01

## 2017-06-01 ENCOUNTER — PATIENT MESSAGE (OUTPATIENT)
Dept: INTERNAL MEDICINE | Facility: CLINIC | Age: 82
End: 2017-06-01

## 2017-06-01 DIAGNOSIS — E53.8 VITAMIN B12 DEFICIENCY: ICD-10-CM

## 2017-06-01 RX ORDER — OXYBUTYNIN CHLORIDE 5 MG/1
5 TABLET ORAL 2 TIMES DAILY
Qty: 60 TABLET | Refills: 11 | Status: SHIPPED | OUTPATIENT
Start: 2017-06-01 | End: 2018-06-01

## 2017-06-01 RX ORDER — CYANOCOBALAMIN 1000 UG/ML
1000 INJECTION, SOLUTION INTRAMUSCULAR; SUBCUTANEOUS
Qty: 10 ML | Refills: 3 | Status: SHIPPED | OUTPATIENT
Start: 2017-06-01

## 2017-06-01 NOTE — TELEPHONE ENCOUNTER
The extended-release cannot be crushed. I will change to immediate-release, 5 mg two times a day. I have sent this to [facility].    I did not discuss Mucinex with the patient. I recommend two times a day as needed for throat congestion or cough.

## 2017-06-01 NOTE — TELEPHONE ENCOUNTER
----- Message from Ev Metzger sent at 6/1/2017 11:37 AM CDT -----  Contact: Delorispt's home care nurse [phone]  Deloris,   Kole's nurse called requesting a call back from Dr Rae or staff about 2 clarification requests.  Pt was prescribed oxybutynin (DITROPAN-XL) 5 MG TR24  Which is extended release, is it ok to crush pill and put in pt's tube?    2nd question is about the Mucinex 20 mg  Nurse needs to know frequency (twice a day? Only once at bedtime?)    Please call Ms Deloris PRITCHETTDORON so this can be updated on his chart at home.  Nurse requested this message be sent as high priority    Thanks  marie

## 2017-06-01 NOTE — TELEPHONE ENCOUNTER
I spoke with Deloris and she verbalized understanding  Also wanted to let Dr Rae know that the Musinex was for the secretions and it helps him out a lot with this.   Just an FYI

## 2017-06-01 NOTE — TELEPHONE ENCOUNTER
I don't believe that any meds can be crused if they are extended release.  Also wants to know about the frequency of Mucinex.

## 2017-06-20 ENCOUNTER — PATIENT MESSAGE (OUTPATIENT)
Dept: NEUROLOGY | Facility: CLINIC | Age: 82
End: 2017-06-20

## 2017-06-20 ENCOUNTER — INITIAL CONSULT (OUTPATIENT)
Dept: OPHTHALMOLOGY | Facility: CLINIC | Age: 82
End: 2017-06-20
Payer: MEDICARE

## 2017-06-20 VITALS — DIASTOLIC BLOOD PRESSURE: 62 MMHG | HEART RATE: 68 BPM | SYSTOLIC BLOOD PRESSURE: 124 MMHG

## 2017-06-20 DIAGNOSIS — H35.3211 EXUDATIVE AGE-RELATED MACULAR DEGENERATION OF RIGHT EYE WITH ACTIVE CHOROIDAL NEOVASCULARIZATION: Primary | ICD-10-CM

## 2017-06-20 DIAGNOSIS — H35.3212 EXUDATIVE AGE-RELATED MACULAR DEGENERATION OF RIGHT EYE WITH INACTIVE CHOROIDAL NEOVASCULARIZATION: ICD-10-CM

## 2017-06-20 DIAGNOSIS — H35.3122 NONEXUDATIVE AGE-RELATED MACULAR DEGENERATION, LEFT EYE, INTERMEDIATE DRY STAGE: ICD-10-CM

## 2017-06-20 DIAGNOSIS — H33.321 RETINAL HOLE OF RIGHT EYE: ICD-10-CM

## 2017-06-20 PROCEDURE — 92225 PR SPECIAL EYE EXAM, INITIAL: CPT | Mod: S$PBB,,, | Performed by: OPHTHALMOLOGY

## 2017-06-20 PROCEDURE — 99999 PR PBB SHADOW E&M-EST. PATIENT-LVL III: CPT | Mod: PBBFAC,,, | Performed by: OPHTHALMOLOGY

## 2017-06-20 PROCEDURE — 99213 OFFICE O/P EST LOW 20 MIN: CPT | Mod: PBBFAC | Performed by: OPHTHALMOLOGY

## 2017-06-20 PROCEDURE — 92225 PR SPECIAL EYE EXAM, INITIAL: CPT | Mod: PBBFAC | Performed by: OPHTHALMOLOGY

## 2017-06-20 PROCEDURE — 92004 COMPRE OPH EXAM NEW PT 1/>: CPT | Mod: S$PBB,,, | Performed by: OPHTHALMOLOGY

## 2017-06-20 NOTE — LETTER
June 20, 2017      Virgil Rae MD  [address]  [facility] [address]           [facility] Ophthalmology  [address]  [facility] [address]  Phone: [phone]  Fax: [phone]          Patient: Justin Sharif   MR Number: [id number]   YOB: 1931   Date of Visit: 6/20/2017       Dear Dr. Virgil Rae:    Thank you for referring Justin Sharif to me for evaluation. Attached you will find relevant portions of my assessment and plan of care.    If you have questions, please do not hesitate to call me. I look forward to following Justin Sharif along with you.    Sincerely,    ADDI Guerrero MD    Enclosure  CC:  No Recipients    If you would like to receive this communication electronically, please contact [email or web address] or [phone] to request more information on [employer] Link access.    For providers and/or their staff who would like to refer a patient to [facility], please contact us through our one-stop-shop provider referral line, [facility], at [phone].    If you feel you have received this communication in error or would no longer like to receive these types of communications, please e-mail [email or web address]

## 2017-06-20 NOTE — PROGRESS NOTES
OCT  OD: PED without SRF/IRF  OS: drusen, inferotemporal PED, no SRF/IRF    A/P  1. Wet ARMD OD  - AREDS, [employer]ms[employer] Grid  - last injection was Nov 21 2016 in [place] per patient was receiving eylea, did not receive injection in Feb 2017 visit      2. Dry ARMD OS  - AREDS (difficulty swallowing, on feeding tube)   - Amsler Grid        3. Hx of retina tears OD s/p retinopexy  - retina flat, no new tears/RD's      4. PCIOL OU    5. PVD OU      3 months OCT

## 2017-06-22 ENCOUNTER — TELEPHONE (OUTPATIENT)
Dept: NEUROLOGY | Facility: CLINIC | Age: 82
End: 2017-06-22

## 2017-06-22 NOTE — TELEPHONE ENCOUNTER
----- Message from Meghana Mcneal NP sent at 5/30/2017 10:15 AM CDT -----  See bernadette 3 months- new to him.

## 2017-06-22 NOTE — TELEPHONE ENCOUNTER
Called pt son and left a voicemail in regards of his father being scheduled with Dr. Bolanos for 8/30 at 8:40 a.m. Informed him to call back if he have any questions.

## 2017-06-23 NOTE — TELEPHONE ENCOUNTER
Called pt son to remind him of scheduled apt and he stated that his father is thinking about moving back because he miss his friends and family.

## 2017-06-23 NOTE — TELEPHONE ENCOUNTER
----- Message from Bia Cisse sent at 6/23/2017  9:07 AM CDT -----  Contact: Kael Sharif (son) @ [phone]  Says he is returning Lynette's call from yesterday.

## 2017-06-26 RX ORDER — DONEPEZIL HYDROCHLORIDE 5 MG/1
5 TABLET, FILM COATED ORAL DAILY
Qty: 30 TABLET | Refills: 11 | Status: SHIPPED | OUTPATIENT
Start: 2017-06-26

## 2017-06-26 RX ORDER — DONEPEZIL HYDROCHLORIDE 5 MG/1
5 TABLET, FILM COATED ORAL DAILY
Qty: 30 TABLET | Refills: 11 | Status: SHIPPED | OUTPATIENT
Start: 2017-06-26 | End: 2017-06-26 | Stop reason: SDUPTHER

## 2017-06-26 NOTE — TELEPHONE ENCOUNTER
----- Message from Александр Garza sent at 6/26/2017 11:52 AM CDT -----  Contact: [facility]   Pharmacy called regarding donepezil (ARICEPT) 5 MG tablet for the above pt.     [facility]  [place], [place] - [address] [facility] [address]  ..[phone].

## 2017-11-13 NOTE — TELEPHONE ENCOUNTER
Called pharmacy back regarding Donepezil.  Patient is out of medication and is requesting an emergency fill to hold over until follow up visit with steven. Please advise   14-Nov-2017

## 2019-06-25 ENCOUNTER — PES CALL (OUTPATIENT)
Dept: ADMINISTRATIVE | Facility: CLINIC | Age: 84
End: 2019-06-25

## 2019-09-18 ENCOUNTER — PES CALL (OUTPATIENT)
Dept: ADMINISTRATIVE | Facility: CLINIC | Age: 84
End: 2019-09-18

## 2019-10-29 ENCOUNTER — PES CALL (OUTPATIENT)
Dept: ADMINISTRATIVE | Facility: CLINIC | Age: 84
End: 2019-10-29